# Patient Record
Sex: MALE | Race: OTHER | NOT HISPANIC OR LATINO | URBAN - METROPOLITAN AREA
[De-identification: names, ages, dates, MRNs, and addresses within clinical notes are randomized per-mention and may not be internally consistent; named-entity substitution may affect disease eponyms.]

---

## 2023-08-26 ENCOUNTER — EMERGENCY (EMERGENCY)
Facility: HOSPITAL | Age: 28
LOS: 1 days | Discharge: HOME | End: 2023-08-26
Attending: EMERGENCY MEDICINE | Admitting: EMERGENCY MEDICINE
Payer: MEDICAID

## 2023-08-26 VITALS
SYSTOLIC BLOOD PRESSURE: 147 MMHG | OXYGEN SATURATION: 99 % | RESPIRATION RATE: 16 BRPM | TEMPERATURE: 99 F | HEART RATE: 63 BPM | DIASTOLIC BLOOD PRESSURE: 103 MMHG

## 2023-08-26 PROCEDURE — 99284 EMERGENCY DEPT VISIT MOD MDM: CPT

## 2023-08-26 RX ORDER — CYCLOBENZAPRINE HYDROCHLORIDE 10 MG/1
5 TABLET, FILM COATED ORAL ONCE
Refills: 0 | Status: COMPLETED | OUTPATIENT
Start: 2023-08-26 | End: 2023-08-26

## 2023-08-26 RX ORDER — IBUPROFEN 200 MG
1 TABLET ORAL
Qty: 20 | Refills: 0
Start: 2023-08-26

## 2023-08-26 RX ORDER — LIDOCAINE 4 G/100G
1 CREAM TOPICAL ONCE
Refills: 0 | Status: COMPLETED | OUTPATIENT
Start: 2023-08-26 | End: 2023-08-26

## 2023-08-26 RX ORDER — ACETAMINOPHEN 500 MG
2 TABLET ORAL
Qty: 60 | Refills: 0
Start: 2023-08-26

## 2023-08-26 RX ORDER — CYCLOBENZAPRINE HYDROCHLORIDE 10 MG/1
1 TABLET, FILM COATED ORAL
Qty: 8 | Refills: 0
Start: 2023-08-26

## 2023-08-26 RX ORDER — IBUPROFEN 200 MG
600 TABLET ORAL ONCE
Refills: 0 | Status: COMPLETED | OUTPATIENT
Start: 2023-08-26 | End: 2023-08-26

## 2023-08-26 RX ADMIN — LIDOCAINE 1 PATCH: 4 CREAM TOPICAL at 10:21

## 2023-08-26 RX ADMIN — CYCLOBENZAPRINE HYDROCHLORIDE 5 MILLIGRAM(S): 10 TABLET, FILM COATED ORAL at 10:20

## 2023-08-26 RX ADMIN — Medication 600 MILLIGRAM(S): at 10:53

## 2023-08-26 RX ADMIN — Medication 600 MILLIGRAM(S): at 10:23

## 2023-08-26 NOTE — ED ADULT NURSE NOTE - NSFALLUNIVINTERV_ED_ALL_ED
Bed/Stretcher in lowest position, wheels locked, appropriate side rails in place/Call bell, personal items and telephone in reach/Instruct patient to call for assistance before getting out of bed/chair/stretcher/Non-slip footwear applied when patient is off stretcher/[place] to call system/Physically safe environment - no spills, clutter or unnecessary equipment/Purposeful proactive rounding/Room/bathroom lighting operational, light cord in reach

## 2023-08-26 NOTE — ED ADULT TRIAGE NOTE - CHIEF COMPLAINT QUOTE
c/o low back pain x 1 week. Pt states the day before pain started, pt was lifting heavy tools at work. Pt fell 2 days ago due to sharp pains in back. Pt ambulatory, gait steady. Denies any PHx.

## 2023-08-26 NOTE — ED PROVIDER NOTE - PATIENT PORTAL LINK FT
You can access the FollowMyHealth Patient Portal offered by [facility] by registering at the following website: [email or web address]. By joining [facility]’s FollowMyHealth portal, you will also be able to view your health information using other applications (apps) compatible with our system.

## 2023-08-26 NOTE — ED PROVIDER NOTE - OBJECTIVE STATEMENT
27 y/o male with no significant PMHx who presented to the ED for low back pain X 1 wk. 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 Pt states over the past wk having increased pain noted after bending down to clean a stoop and then trying to get back up having sharp pain that caused him to fall. Pt denies any direct trauma to low back Pt denies any loss of bowel or bladder function. Pt denies any loss of bowel or bladder function. Pt denies any weakness or numbness. Pt notes pain with movement of trying to stand or sit. Pt denies any fever, chills, nausea, vomiting, SOB, chest pain, or abd pain.

## 2023-08-26 NOTE — ED PROVIDER NOTE - NSFOLLOWUPINSTRUCTIONS_ED_ALL_ED_FT
YOU WERE SEEN FOR BACK PAIN    YOU HAVE BEEN DIAGNOSED WITH BACK STRAIN    REST AND PLENTY OF FLUIDS    MAY TAKE TYLENOL 650mg EVERY 4 HOURS AS NEEDED FOR MILD PAIN   MAY TAKE MOTRIN 600mg EVERY 6 HOURS AS NEEDED FOR MODERATE PAIN. TAKE WITH FOOD.   MAY TAKE FLEXERIL 5mg EVERY 6 HOURS AS NEEDED FOR SEVERE PAIN.     FOLLOW UP WITH SPINE CENTER AS DISCUSSED    FOLLOW UP WITH YOUR PRIMARY CARE PROVIDER WITHIN 1 WEEK.    RETURN TO THE EMERGENCY DEPARTMENT FOR WEAKNESS/NUMBNESS, LOSS OF BOWEL OR BLADDER FUNCTION, OR FOR ANY WORSENING SYMPTOMS. YOU WERE SEEN FOR BACK PAIN    YOU HAVE BEEN DIAGNOSED WITH BACK STRAIN    REST AND PLENTY OF FLUIDS    MAY TAKE TYLENOL 650mg EVERY 4 HOURS AS NEEDED FOR MILD PAIN   MAY TAKE MOTRIN 400mg EVERY 6 HOURS AS NEEDED FOR MODERATE PAIN. TAKE WITH FOOD.   MAY TAKE FLEXERIL 5mg EVERY 6 HOURS AS NEEDED FOR SEVERE PAIN.     FOLLOW UP WITH SPINE CENTER AS DISCUSSED    FOLLOW UP WITH YOUR PRIMARY CARE PROVIDER WITHIN 1 WEEK.    RETURN TO THE EMERGENCY DEPARTMENT FOR WEAKNESS/NUMBNESS, LOSS OF BOWEL OR BLADDER FUNCTION, OR FOR ANY WORSENING SYMPTOMS.

## 2023-08-26 NOTE — ED ADULT NURSE NOTE - OBJECTIVE STATEMENT
Patient received to intake bed 10A A&Ox4, ambulatory, accompanied by family member coming to the ED for complaints of lower back pain that started to worsen approximately 2 days ago. Patient states to be a  and was picking up heavy objects when he began to feel the pain worsen. Patient states to have difficulty standing up and laying down, no problems with ambulation. Denies falling/injury/trauma, states to have had to "fall back to help get himself up". Denies numbness and tingling to the hands/feet. RR equal and unlabored. +sensation. Care plan continued. Medicated as ordered. Comfort measures provided. Safety maintained.

## 2023-08-26 NOTE — ED PROVIDER NOTE - CLINICAL SUMMARY MEDICAL DECISION MAKING FREE TEXT BOX
27 y/o male with no significant PMHx who presented to the ED for low back pain X 1 wk.   Concern for Back strain/herniated disc  Analgesia, [facility] f/u

## 2023-08-26 NOTE — ED PROVIDER NOTE - PROGRESS NOTE DETAILS
Dr. Hampton: Pt states feeling better. Ambulating without any difficulty. Sent medication to pharmacy and sent info to [facility]charge [facility] for [facility] f/u.

## 2023-08-28 ENCOUNTER — APPOINTMENT (OUTPATIENT)
Dept: ORTHOPEDIC SURGERY | Facility: CLINIC | Age: 28
End: 2023-08-28
Payer: MEDICAID

## 2023-08-28 VITALS
BODY MASS INDEX: 29.62 KG/M2 | WEIGHT: 200 LBS | HEART RATE: 75 BPM | SYSTOLIC BLOOD PRESSURE: 134 MMHG | DIASTOLIC BLOOD PRESSURE: 89 MMHG | HEIGHT: 69 IN

## 2023-08-28 DIAGNOSIS — S39.012A STRAIN OF MUSCLE, FASCIA AND TENDON OF LOWER BACK, INITIAL ENCOUNTER: ICD-10-CM

## 2023-08-28 PROBLEM — Z00.00 ENCOUNTER FOR PREVENTIVE HEALTH EXAMINATION: Status: ACTIVE | Noted: 2023-08-28

## 2023-08-28 PROCEDURE — 99203 OFFICE O/P NEW LOW 30 MIN: CPT | Mod: 25

## 2023-08-28 PROCEDURE — 72100 X-RAY EXAM L-S SPINE 2/3 VWS: CPT

## 2023-08-30 PROBLEM — S39.012A LUMBAR SPINE STRAIN: Status: ACTIVE | Noted: 2023-08-30

## 2023-08-30 NOTE — DISCUSSION/SUMMARY
[de-identified] : We discussed further treatment options.  He will try course of physical therapy.  Advanced imaging if not improved or worsen.

## 2023-08-30 NOTE — PHYSICAL EXAM
[de-identified] : Examination of the lumbar spine reveals no midline tenderness palpation, step-offs, or skin lesions. Decreased range of motion with respect to flexion, extension, lateral bending, and rotation. No tenderness to palpation of the sciatic notch. No tenderness palpation of the bilateral greater trochanters. No pain with passive internal/external rotation of the hips. No instability of bilateral lower extremities.  Negative ELHAM. Negative straight leg raise bilaterally. No bowstring. Negative femoral stretch. 5 out of 5 iliopsoas, hip abductors, hips adductors, quadriceps, hamstrings, gastrocsoleus, tibialis anterior, extensor hallucis longus, peroneals. Grossly intact sensation to light touch bilateral lower extremities. 1+ patellar and Achilles reflexes. Downgoing Babinski. No clonus. Intact proprioception. Palpable pulses. No skin lesion and no edema on the right and left lower extremities. [de-identified] : AP lateral lumbar x-rays with mild loss of disc height without instability or aggressive lesions

## 2023-08-30 NOTE — HISTORY OF PRESENT ILLNESS
[de-identified] : 28 year old male presents with one week of lower back pain and stiffness. The pain worsens with heavy lifting or bending. He denies lower extremity radiation, numbness or tingling. He was evaluated in the ED at [facility] over the weekend. He was prescribed Tylenol, ibuprofen and a muscle relaxer which he is taking with some relief. 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